# Patient Record
Sex: MALE
[De-identification: names, ages, dates, MRNs, and addresses within clinical notes are randomized per-mention and may not be internally consistent; named-entity substitution may affect disease eponyms.]

---

## 2024-04-16 PROBLEM — Z00.00 ENCOUNTER FOR PREVENTIVE HEALTH EXAMINATION: Status: ACTIVE | Noted: 2024-04-16

## 2024-05-07 ENCOUNTER — APPOINTMENT (OUTPATIENT)
Dept: ORTHOPEDIC SURGERY | Facility: CLINIC | Age: 64
End: 2024-05-07
Payer: COMMERCIAL

## 2024-05-07 DIAGNOSIS — M54.6 PAIN IN THORACIC SPINE: ICD-10-CM

## 2024-05-07 DIAGNOSIS — M54.50 LOW BACK PAIN, UNSPECIFIED: ICD-10-CM

## 2024-05-07 DIAGNOSIS — M54.16 RADICULOPATHY, LUMBAR REGION: ICD-10-CM

## 2024-05-07 DIAGNOSIS — M48.10 ANKYLOSING HYPEROSTOSIS [FORESTIER], SITE UNSPECIFIED: ICD-10-CM

## 2024-05-07 DIAGNOSIS — M54.2 CERVICALGIA: ICD-10-CM

## 2024-05-07 PROCEDURE — 72082 X-RAY EXAM ENTIRE SPI 2/3 VW: CPT

## 2024-05-07 PROCEDURE — 99204 OFFICE O/P NEW MOD 45 MIN: CPT

## 2024-05-08 NOTE — END OF VISIT
[FreeTextEntry3] : I Nolvia Manning, acting as scribe, attest that this documentation has been prepared under the direction and in the presence of Provider Carter Ledezma MD.   I was physically present during the service to the patient and/or personally examined the patient and I was directly involved in the management plan and recommendations of the care provided to the patient.   I Dr. Ledezma, reviewed the history, the physical exam, and plan as documented by the PA. The documentation recorded by the PA, in my presence, accurately reflects the service I personally performed, and the decisions made by me with my edits as appropriate.  Carter Ledezma MD   Documented by Beth Dee acting as a scribe for Dr. Carter Ledezma. 05/07/2024   All medical record entries made by the Scribe were at my, Dr. Carter Ledezma. direction and personally dictated by me on 05/07/2024. I have reviewed the chart and agree that the record accurately reflects my personal performance of the history, physical exam, assessment and plan. I have also personally directed, reviewed, and agreed with the chart.

## 2024-05-08 NOTE — END OF VISIT
[FreeTextEntry3] : I Nolvia Manning, acting as scribe, attest that this documentation has been prepared under the direction and in the presence of Provider Crater Ledezma MD.   I was physically present during the service to the patient and/or personally examined the patient and I was directly involved in the management plan and recommendations of the care provided to the patient.   I Dr. Ledezma, reviewed the history, the physical exam, and plan as documented by the PA. The documentation recorded by the PA, in my presence, accurately reflects the service I personally performed, and the decisions made by me with my edits as appropriate.  Carter Ledezma MD   Documented by Beth Dee acting as a scribe for Dr. Carter Ledezma. 05/07/2024   All medical record entries made by the Scribe were at my, Dr. Carter Ledezma. direction and personally dictated by me on 05/07/2024. I have reviewed the chart and agree that the record accurately reflects my personal performance of the history, physical exam, assessment and plan. I have also personally directed, reviewed, and agreed with the chart.

## 2024-05-08 NOTE — PLAN
[TextEntry] : He will follow up after completion of the aforementioned films prior to consideration of further treatment, which will likely include decompression with or without fusion. The risk alternatives, the benefits of which were explained in detail, a patient is having a difficult time functioning. He has had a difficult time walking. He is having a difficult time working. Nonetheless he is stoic, tolerant, motivated, and he is pushing through.

## 2024-05-08 NOTE — HISTORY OF PRESENT ILLNESS
[de-identified] : Ok is here with predominantly radiating right-sided leg pain. He also has back pain, though this is tolerable.  He has numbness, tingling, penzenals, and weakness. He has been unable to walk without a cane. He has bilateral hand numbness. He gets intermittent severe pain into his right groin.

## 2024-05-08 NOTE — ASSESSMENT
[FreeTextEntry1] : His hips do appear well preserved and I was able to bring them through reasonable range of motion testing without reproduction of his groin pain. He was referred for MRIs of the cervical, thoracic, and lumbar spines.

## 2024-05-08 NOTE — HISTORY OF PRESENT ILLNESS
[de-identified] : Ok is here with predominantly radiating right-sided leg pain. He also has back pain, though this is tolerable.  He has numbness, tingling, penzenals, and weakness. He has been unable to walk without a cane. He has bilateral hand numbness. He gets intermittent severe pain into his right groin.

## 2024-05-08 NOTE — PHYSICAL EXAM
[Antalgic] : antalgic [Cane] : ambulates with cane [] : Motor: [___/5] : right ([unfilled]/5) [Motor Strength Lower Extremities] : left (5/5) [LE] : Sensory: Intact in bilateral lower extremities [Normal] : Alert and in no acute distress [de-identified] : He is neurologically intact. He has no long tract signs nor hyperreflexia.  Lumbar Spine:    Palpation: +ttp throughout Rt paraspinal musculature, worst at level of L2-3    [de-identified] : He has x-rays which show diffuse idiopathic skeletal hyperostosis, which I showed to him. I am able to see the top of both of his hips on the AP x-ray. Again there is DISH which extends down to L2, and interestingly from the sacrum up to L4. We will see what these two mobile segments in between are affected and how this is affecting his nerves.

## 2024-05-08 NOTE — PHYSICAL EXAM
[Antalgic] : antalgic [Cane] : ambulates with cane [] : Motor: [___/5] : right ([unfilled]/5) [Motor Strength Lower Extremities] : left (5/5) [LE] : Sensory: Intact in bilateral lower extremities [Normal] : Alert and in no acute distress [de-identified] : He is neurologically intact. He has no long tract signs nor hyperreflexia.  Lumbar Spine:    Palpation: +ttp throughout Rt paraspinal musculature, worst at level of L2-3    [de-identified] : He has x-rays which show diffuse idiopathic skeletal hyperostosis, which I showed to him. I am able to see the top of both of his hips on the AP x-ray. Again there is DISH which extends down to L2, and interestingly from the sacrum up to L4. We will see what these two mobile segments in between are affected and how this is affecting his nerves.

## 2024-05-16 RX ORDER — DICLOFENAC SODIUM 75 MG/1
75 TABLET, DELAYED RELEASE ORAL
Qty: 60 | Refills: 0 | Status: ACTIVE | COMMUNITY
Start: 2024-05-16 | End: 1900-01-01

## 2024-06-13 ENCOUNTER — APPOINTMENT (OUTPATIENT)
Dept: ORTHOPEDIC SURGERY | Facility: CLINIC | Age: 64
End: 2024-06-13

## 2024-08-13 ENCOUNTER — APPOINTMENT (OUTPATIENT)
Dept: ORTHOPEDIC SURGERY | Facility: CLINIC | Age: 64
End: 2024-08-13
Payer: COMMERCIAL

## 2024-08-13 DIAGNOSIS — M47.816 SPONDYLOSIS W/OUT MYELOPATHY OR RADICULOPATHY, LUMBAR REGION: ICD-10-CM

## 2024-08-13 DIAGNOSIS — M48.02 SPINAL STENOSIS, CERVICAL REGION: ICD-10-CM

## 2024-08-13 DIAGNOSIS — M54.50 LOW BACK PAIN, UNSPECIFIED: ICD-10-CM

## 2024-08-13 PROCEDURE — 99214 OFFICE O/P EST MOD 30 MIN: CPT

## 2024-08-14 NOTE — PLAN
[TextEntry] : I've recommended conservative treatment including physiotherapy with manual traction, chin tucks, shoulder retraction, and generalized activity modification. He asked for a recommendation for a recliner. I do not have one. He had questions regarding his DISH. I recommended intermittent use of diclofenac and activity modification. He will follow-up by email in six weeks' time. If his symptoms are recalcitrant, he may consider an epidural steroid injection. Other options in the future may include, but are not limited to, acupuncture, chiropractic, and repeat injection.

## 2024-08-14 NOTE — HISTORY OF PRESENT ILLNESS
[de-identified] : Ok Galdamez is here with a new cervical MRI. He has no cord compression. 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 He is neurologically intact. He gets some left-sided ulnar nerve symptoms. I do not believe these are coming from the neck.

## 2024-08-14 NOTE — ADDENDUM
[FreeTextEntry1] : I, Dulce Crowe (scribe) assisted in filling out this chart under the dictation of Carter Ledezma on 08/13/2024

## 2024-08-14 NOTE — END OF VISIT
[FreeTextEntry3] : Documented by Dulce Crowe acting as a scribe for Carter Ledezma on 08/13/2024   All medical record entries made by the Scribe were at my, Dr. Carter Ledezma direction and personally dictated by me on 08/13/2024 . I have reviewed the chart and agree that the record accurately reflects my personal performance of the history, physical exam, assessment and plan. I have also personally directed, reviewed, and agreed with the chart.

## 2024-08-14 NOTE — HISTORY OF PRESENT ILLNESS
[de-identified] : Ok Galdamez is here with a new cervical MRI. He has no cord compression. 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 He is neurologically intact. He gets some left-sided ulnar nerve symptoms. I do not believe these are coming from the neck.

## 2024-10-24 ENCOUNTER — TRANSCRIPTION ENCOUNTER (OUTPATIENT)
Age: 64
End: 2024-10-24